# Patient Record
Sex: MALE | Race: WHITE | NOT HISPANIC OR LATINO | Employment: OTHER | URBAN - METROPOLITAN AREA
[De-identification: names, ages, dates, MRNs, and addresses within clinical notes are randomized per-mention and may not be internally consistent; named-entity substitution may affect disease eponyms.]

---

## 2023-07-27 ENCOUNTER — TRANSITIONAL CARE UNIT VISIT (OUTPATIENT)
Dept: GERIATRICS | Facility: CLINIC | Age: 78
End: 2023-07-27
Payer: COMMERCIAL

## 2023-07-27 VITALS
SYSTOLIC BLOOD PRESSURE: 118 MMHG | HEIGHT: 70 IN | RESPIRATION RATE: 18 BRPM | WEIGHT: 315 LBS | TEMPERATURE: 98 F | OXYGEN SATURATION: 96 % | DIASTOLIC BLOOD PRESSURE: 68 MMHG | HEART RATE: 105 BPM | BODY MASS INDEX: 45.1 KG/M2

## 2023-07-27 DIAGNOSIS — E66.01 MORBID OBESITY (H): ICD-10-CM

## 2023-07-27 DIAGNOSIS — R53.81 PHYSICAL DECONDITIONING: Primary | ICD-10-CM

## 2023-07-27 DIAGNOSIS — F10.20 ALCOHOL USE DISORDER, SEVERE, DEPENDENCE (H): ICD-10-CM

## 2023-07-27 DIAGNOSIS — I10 BENIGN ESSENTIAL HYPERTENSION: ICD-10-CM

## 2023-07-27 DIAGNOSIS — R00.0 SINUS TACHYCARDIA: ICD-10-CM

## 2023-07-27 DIAGNOSIS — M21.70 ACQUIRED LEG LENGTH DISCREPANCY: ICD-10-CM

## 2023-07-27 PROCEDURE — 99308 SBSQ NF CARE LOW MDM 20: CPT | Performed by: NURSE PRACTITIONER

## 2023-07-27 RX ORDER — TRAZODONE HYDROCHLORIDE 100 MG/1
1 TABLET ORAL AT BEDTIME
COMMUNITY
Start: 2023-07-24

## 2023-07-27 RX ORDER — LANOLIN ALCOHOL/MO/W.PET/CERES
1 CREAM (GRAM) TOPICAL DAILY
COMMUNITY
Start: 2023-07-25

## 2023-07-27 RX ORDER — MULTIVITAMIN,THER AND MINERALS
1 TABLET ORAL DAILY
COMMUNITY
Start: 2023-07-25
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
## 2023-07-28 PROBLEM — M21.70 ACQUIRED LEG LENGTH DISCREPANCY: Status: ACTIVE | Noted: 2018-03-01

## 2023-07-28 PROBLEM — E78.00 HYPERCHOLESTEROLEMIA: Status: ACTIVE | Noted: 2023-07-28

## 2023-07-28 PROBLEM — R00.0 SINUS TACHYCARDIA: Status: ACTIVE | Noted: 2023-07-24

## 2023-07-28 PROBLEM — F10.20 ALCOHOL USE DISORDER, SEVERE, DEPENDENCE (H): Status: ACTIVE | Noted: 2023-07-19

## 2023-07-28 PROBLEM — I25.10 ARTERIOSCLEROSIS OF CORONARY ARTERY: Status: ACTIVE | Noted: 2023-07-28

## 2023-08-03 ENCOUNTER — TRANSITIONAL CARE UNIT VISIT (OUTPATIENT)
Dept: GERIATRICS | Facility: CLINIC | Age: 78
End: 2023-08-03
Payer: COMMERCIAL

## 2023-08-03 VITALS
TEMPERATURE: 98.1 F | HEIGHT: 70 IN | WEIGHT: 315 LBS | BODY MASS INDEX: 45.1 KG/M2 | DIASTOLIC BLOOD PRESSURE: 58 MMHG | OXYGEN SATURATION: 95 % | RESPIRATION RATE: 18 BRPM | HEART RATE: 108 BPM | SYSTOLIC BLOOD PRESSURE: 115 MMHG

## 2023-08-03 DIAGNOSIS — I10 BENIGN ESSENTIAL HYPERTENSION: ICD-10-CM

## 2023-08-03 DIAGNOSIS — R53.81 PHYSICAL DECONDITIONING: Primary | ICD-10-CM

## 2023-08-03 DIAGNOSIS — R00.0 SINUS TACHYCARDIA: ICD-10-CM

## 2023-08-03 PROCEDURE — 99308 SBSQ NF CARE LOW MDM 20: CPT | Performed by: NURSE PRACTITIONER

## 2023-08-03 NOTE — LETTER
"    8/3/2023        RE: Chriss Puente  [address] [place] [address]        [facility] GERIATRICS    Chief Complaint   Patient presents with     RECHECK     HPI:  Chriss Puente is a 77 year old  (1945), who is being seen today for an episodic care visit at: [facility] ([facility]) [[id number]]. [facility]. Hospital stay 7/18/23 through 7/25/23. Patient with PMH HTN, CAD, SARAH, and alcohol use disorder presented to the ED after a fall. He was admitted with rhabdomyolysis. He drank an excess amount of vodka on 7/17/23. His family found him on Tuesday morning with his left arm trapped in the bed frame and him laying on the ground. Only injuries found on imaging were probably hematoma of left scapularis muscle and radial nerve injury. He had persistent sinus tachycardia during stay, this was found to be long-standing.     Today's concern is:   Patient reports improving pain in his arm. Per therapy he fatigues easily. He was able to walk 100 feet. He has said that at baseline he has not been very active.     Allergies, and PMH/PSH reviewed in EPIC today.  REVIEW OF SYSTEMS:  4 point ROS including Respiratory, CV, GI and , other than that noted in the HPI,  is negative    Objective:   /58   Pulse 108   Temp 98.1  F (36.7  C)   Resp 18   Ht 1.778 m (5' 10\")   Wt 145.5 kg (320 lb 12.8 oz)   SpO2 95%   BMI 46.03 kg/m    GENERAL APPEARANCE:  Alert, in no distress  RESP:  no respiratory distress      Assessment/Plan:  (R53.81) Physical deconditioning  (primary encounter diagnosis)  Comment: Acute on chronic deconditioning. Slowly improving.  Plan: PT/OT eval and treat, discharge planning per their recommendations.    (R00.0) Sinus tachycardia  Comment: Chronic, mild  Plan: Continue current POC with no changes at this time and adjustments as needed.    (I10) Benign essential hypertension  Comment: Currently well controlled  Plan: Continue current POC with no changes at this " time and adjustments as needed.        Electronically signed by: MAY Kramer CNP           Sincerely,        MYA Kramer CNP

## 2023-08-04 NOTE — PROGRESS NOTES
"[facility] GERIATRICS    Chief Complaint   Patient presents with    RECHECK     HPI:  Chriss Puente is a 77 year old  (1945), who is being seen today for an episodic care visit at: [facility] ([facility]) [[id number]]. [facility]. Hospital stay 7/18/23 through 7/25/23. Patient with PMH HTN, CAD, SARAH, and alcohol use disorder presented to the ED after a fall. He was admitted with rhabdomyolysis. He drank an excess amount of vodka on 7/17/23. His family found him on Tuesday morning with his left arm trapped in the bed frame and him laying on the ground. Only injuries found on imaging were probably hematoma of left scapularis muscle and radial nerve injury. He had persistent sinus tachycardia during stay, this was found to be long-standing.     Today's concern is:   Patient reports improving pain in his arm. Per therapy he fatigues easily. He was able to walk 100 feet. He has said that at baseline he has not been very active.     Allergies, and PMH/PSH reviewed in EPIC today.  REVIEW OF SYSTEMS:  4 point ROS including Respiratory, CV, GI and , other than that noted in the HPI,  is negative    Objective:   /58   Pulse 108   Temp 98.1  F (36.7  C)   Resp 18   Ht 1.778 m (5' 10\")   Wt 145.5 kg (320 lb 12.8 oz)   SpO2 95%   BMI 46.03 kg/m    GENERAL APPEARANCE:  Alert, in no distress  RESP:  no respiratory distress      Assessment/Plan:  (R53.81) Physical deconditioning  (primary encounter diagnosis)  Comment: Acute on chronic deconditioning. Slowly improving.  Plan: PT/OT eval and treat, discharge planning per their recommendations.    (R00.0) Sinus tachycardia  Comment: Chronic, mild  Plan: Continue current POC with no changes at this time and adjustments as needed.    (I10) Benign essential hypertension  Comment: Currently well controlled  Plan: Continue current POC with no changes at this time and adjustments as needed.        Electronically signed by: MAY Kramer CNP "

## 2023-08-10 ENCOUNTER — DISCHARGE SUMMARY NURSING HOME (OUTPATIENT)
Dept: GERIATRICS | Facility: CLINIC | Age: 78
End: 2023-08-10
Payer: COMMERCIAL

## 2023-08-10 VITALS
DIASTOLIC BLOOD PRESSURE: 98 MMHG | RESPIRATION RATE: 20 BRPM | OXYGEN SATURATION: 93 % | SYSTOLIC BLOOD PRESSURE: 132 MMHG | WEIGHT: 315 LBS | HEIGHT: 70 IN | HEART RATE: 94 BPM | TEMPERATURE: 98.6 F | BODY MASS INDEX: 45.1 KG/M2

## 2023-08-10 DIAGNOSIS — R53.81 PHYSICAL DECONDITIONING: Primary | ICD-10-CM

## 2023-08-10 DIAGNOSIS — F10.20 ALCOHOL USE DISORDER, SEVERE, DEPENDENCE (H): ICD-10-CM

## 2023-08-10 DIAGNOSIS — I10 BENIGN ESSENTIAL HYPERTENSION: ICD-10-CM

## 2023-08-10 DIAGNOSIS — R00.0 SINUS TACHYCARDIA: ICD-10-CM

## 2023-08-10 PROCEDURE — 99315 NF DSCHRG MGMT 30 MIN/LESS: CPT | Performed by: NURSE PRACTITIONER

## 2023-08-10 NOTE — LETTER
8/10/2023        RE: Chriss Puente  [address]  [place] [address]        [facility] GERIATRICS DISCHARGE SUMMARY  PATIENT'S NAME: Chriss Puente  YOB: 1945  MEDICAL RECORD NUMBER:  [id number]  Place of Service where encounter took place:  [facility] ([facility]) [[id number]]    PRIMARY CARE PROVIDER AND CLINIC RESPONSIBLE AFTER TRANSFER:   LETI  BRENDA, [address] / [place] [address]    Non-[facility] Provider     Transferring providers: MAY Kramer CNP  Recent Hospitalization/ED:  Hospital  [facility] stay 7/18/23 to 7/25/23.  Date of SNF Admission: July 25, 2023  Date of SNF (anticipated) Discharge: August 11, 2023  Discharged to: previous independent home      CODE STATUS/ADVANCE DIRECTIVES DISCUSSION:  Full Code   ALLERGIES: Cephalexin and Pcn [penicillins]    NURSING FACILITY COURSE   Medication Changes/Rationale:   none    Summary of nursing facility stay:   Chriss Puente  is a 77 year old  (1945), admitted to the above facility from  [facility]. Hospital stay 7/18/23 through 7/25/23. Patient with PMH HTN, CAD, SARAH, and alcohol use disorder presented to the ED after a fall. He was admitted with rhabdomyolysis. He drank an excess amount of vodka on 7/17/23. His family found him on Tuesday morning with his left arm trapped in the bed frame and him laying on the ground. Only injuries found on imaging were probably hematoma of left scapularis muscle and radial nerve injury. He had persistent sinus tachycardia during stay, this was found to be long-standing.     Physical deconditioning  Improving. Therapy recommended home care, but he declined due to his wife's mental health and they have several cats. He feels that he will be safe at home.     Sinus tachycardia  Chronic. HR generally runs low 100s.     Benign essential hypertension  Noted in history. Not on medications. Well controlled    Alcohol use disorder, severe, dependence  "(H)  Patient reports that he drank excessive vodka that night because the recycling was getting picked up, so he thought he would just empty the bottle. He has learned his lesson to not do that again. He is going to try to abstain completely from alcohol      Discharge Medications:  Current Outpatient Medications   Medication Sig Dispense Refill     Multiple Vitamins-Minerals (SUPER THERA EDEN M) TABS Take 1 tablet by mouth daily       thiamine (B-1) 100 MG tablet Take 1 tablet by mouth daily       traZODone (DESYREL) 100 MG tablet Take 1 tablet by mouth At Bedtime          Controlled medications:   not applicable/none     Past Medical History: No past medical history on file.  Physical Exam:   Vitals: BP (!) 132/98   Pulse 94   Temp 98.6  F (37  C)   Resp 20   Ht 1.778 m (5' 10\")   Wt 145.5 kg (320 lb 12.8 oz)   SpO2 93%   BMI 46.03 kg/m    BMI: Body mass index is 46.03 kg/m .  GENERAL APPEARANCE:  Alert, in no distress, morbidly obese  ENT:  Mouth and posterior oropharynx normal, moist mucous membranes  EYES:  EOM normal, conjunctiva and lids normal  RESP:  no respiratory distress  PSYCH:  oriented X 3, affect and mood normal     SNF labs: Labs done in SNF are in [facility]. Please refer to them using [facility] Everywhere.    DISCHARGE PLAN:  Follow up labs: No labs orders/due  Medical Follow Up:      Follow up with primary care provider in 2-4 weeks  Discharge Services: Patient declined      TOTAL DISCHARGE TIME:   Less than or equal to 30 minutes  Electronically signed by:  MAY Kramer CNP                   Sincerely,        MAY Kramer CNP      "

## 2023-08-11 NOTE — PROGRESS NOTES
[facility] GERIATRICS DISCHARGE SUMMARY  PATIENT'S NAME: Chriss Puente  YOB: 1945  MEDICAL RECORD NUMBER:  [id number]  Place of Service where encounter took place:  [facility] ([facility]) [[id number]]    PRIMARY CARE PROVIDER AND CLINIC RESPONSIBLE AFTER TRANSFER:   LETI GUTIERREZ, [address] / [place] [address]    Non-[facility] Provider     Transferring providers: MAY Kramer CNP  Recent Hospitalization/ED:  Hospital  [facility] stay 7/18/23 to 7/25/23.  Date of SNF Admission: July 25, 2023  Date of SNF (anticipated) Discharge: August 11, 2023  Discharged to: previous independent home      CODE STATUS/ADVANCE DIRECTIVES DISCUSSION:  Full Code   ALLERGIES: Cephalexin and Pcn [penicillins]    NURSING FACILITY COURSE   Medication Changes/Rationale:   none    Summary of nursing facility stay:   Chriss Puente  is a 77 year old  (1945), admitted to the above facility from  [facility]. Hospital stay 7/18/23 through 7/25/23. Patient with PMH HTN, CAD, SARAH, and alcohol use disorder presented to the ED after a fall. He was admitted with rhabdomyolysis. He drank an excess amount of vodka on 7/17/23. His family found him on Tuesday morning with his left arm trapped in the bed frame and him laying on the ground. Only injuries found on imaging were probably hematoma of left scapularis muscle and radial nerve injury. He had persistent sinus tachycardia during stay, this was found to be long-standing.     Physical deconditioning  Improving. Therapy recommended home care, but he declined due to his wife's mental health and they have several cats. He feels that he will be safe at home.     Sinus tachycardia  Chronic. HR generally runs low 100s.     Benign essential hypertension  Noted in history. Not on medications. Well controlled    Alcohol use disorder, severe, dependence (H)  Patient reports that he drank excessive vodka that night because the recycling was getting  "picked up, so he thought he would just empty the bottle. He has learned his lesson to not do that again. He is going to try to abstain completely from alcohol      Discharge Medications:  Current Outpatient Medications   Medication Sig Dispense Refill    Multiple Vitamins-Minerals (SUPER THERA EDEN M) TABS Take 1 tablet by mouth daily      thiamine (B-1) 100 MG tablet Take 1 tablet by mouth daily      traZODone (DESYREL) 100 MG tablet Take 1 tablet by mouth At Bedtime          Controlled medications:   not applicable/none     Past Medical History: No past medical history on file.  Physical Exam:   Vitals: BP (!) 132/98   Pulse 94   Temp 98.6  F (37  C)   Resp 20   Ht 1.778 m (5' 10\")   Wt 145.5 kg (320 lb 12.8 oz)   SpO2 93%   BMI 46.03 kg/m    BMI: Body mass index is 46.03 kg/m .  GENERAL APPEARANCE:  Alert, in no distress, morbidly obese  ENT:  Mouth and posterior oropharynx normal, moist mucous membranes  EYES:  EOM normal, conjunctiva and lids normal  RESP:  no respiratory distress  PSYCH:  oriented X 3, affect and mood normal     SNF labs: Labs done in SNF are in [facility]. Please refer to them using EPIC/Care Everywhere.    DISCHARGE PLAN:  Follow up labs: No labs orders/due  Medical Follow Up:      Follow up with primary care provider in 2-4 weeks  Discharge Services: Patient declined      TOTAL DISCHARGE TIME:   Less than or equal to 30 minutes  Electronically signed by:  MAY Kramer CNP                 "